# Patient Record
Sex: MALE | Race: WHITE
[De-identification: names, ages, dates, MRNs, and addresses within clinical notes are randomized per-mention and may not be internally consistent; named-entity substitution may affect disease eponyms.]

---

## 2017-07-07 ENCOUNTER — HOSPITAL ENCOUNTER (EMERGENCY)
Dept: HOSPITAL 17 - PHED | Age: 27
Discharge: HOME | End: 2017-07-07
Payer: SELF-PAY

## 2017-07-07 VITALS — BODY MASS INDEX: 30.16 KG/M2 | WEIGHT: 222.67 LBS | HEIGHT: 72 IN

## 2017-07-07 VITALS
DIASTOLIC BLOOD PRESSURE: 84 MMHG | OXYGEN SATURATION: 98 % | TEMPERATURE: 98.2 F | SYSTOLIC BLOOD PRESSURE: 124 MMHG | RESPIRATION RATE: 18 BRPM | HEART RATE: 99 BPM

## 2017-07-07 VITALS
DIASTOLIC BLOOD PRESSURE: 84 MMHG | HEART RATE: 99 BPM | TEMPERATURE: 98.2 F | SYSTOLIC BLOOD PRESSURE: 124 MMHG | OXYGEN SATURATION: 98 % | RESPIRATION RATE: 18 BRPM

## 2017-07-07 DIAGNOSIS — K02.9: ICD-10-CM

## 2017-07-07 DIAGNOSIS — R51: Primary | ICD-10-CM

## 2017-07-07 PROCEDURE — 99283 EMERGENCY DEPT VISIT LOW MDM: CPT

## 2017-07-07 NOTE — PD
HPI


Chief Complaint:  Oral / Dental Pain or Problem


Time Seen by Provider:  02:04


Travel History


International Travel<30 days:  No


Contact w/Intl Traveler<30days:  No


Traveled to known affect area:  No





History of Present Illness


HPI


Patient is a 27-year-old male presents to emergency department for evaluation 

of headache.  Patient states that he's noticed his teeth have been hurting 

primarily in the left lower and right lower side for the past few days and this 

is extended into pain under his tongue now radiating up into the occiput of his 

head.  Denies any fever denies any facial swelling.  States that he has not 

followed up with a dentist in the past, he had some amoxicillin that was left 

over from a prior prescription and he took some of these the other day.  Has 

not tried anything for pain prior to arrival.  Does not follow-up with a 

dentist.  Denies the worst headache of his life, denies thunderclap presentation

, denies any blurred vision focalized weakness numbness tingling in extremities.





PFSH


Past Medical History


Asthma:  No


Diminished Hearing:  No


GERD:  Yes (AS AN INFANT)


Seizures:  Yes (seizures as a small child - last seizure around age 3)





Social History


Alcohol Use:  No


Tobacco Use:  No


Substance Use:  No





Allergies-Medications


(Allergen,Severity, Reaction):  


Coded Allergies:  


     No Known Allergies (Verified , 7/7/17)


Reported Meds & Prescriptions





Reported Meds & Active Scripts


Active


Amoxicillin 500 Mg Tab 500 Mg PO BID 10 Days


Ibuprofen 600 Mg Tab 600 Mg PO Q6H PRN








Review of Systems


Except as stated in HPI:  all other systems reviewed are Neg





Physical Exam


Narrative


GENERAL: Well-nourished, well-developed patient.  In no distress.  Appears 

comfortable and not in any pain.


SKIN: Focused skin assessment warm/dry.


HEAD: Normocephalic.  Atraumatic, bearded but no facial swelling seen.


EYES: No scleral icterus. No injection or drainage. 


ENT: TMs clear bilaterally, oropharynx clear and patent, no sublingual 

tenderness.  There are multiple dental caries seen particularly on the molars 

in bilateral upper and lower sides.  There is some erosion on the primary 

incisor on the maxillary surface.  No obvious dental abscess seen.


NECK: Supple, trachea midline. No JVD or lymphadenopathy.


CARDIOVASCULAR: Regular rate and rhythm without murmurs, gallops, or rubs. 


RESPIRATORY: Breath sounds equal bilaterally. No accessory muscle use.


GASTROINTESTINAL: Abdomen soft, non-tender, nondistended. 


MUSCULOSKELETAL: No cyanosis, or edema. 


NEUROLOGICAL: Cranial nerves II through XII are grossly intact and nonfocal, 5 

out of 5 strength in all 4 extremities.


BACK: Nontender without obvious deformity. No CVA tenderness.





Data


Data


Last Documented VS





Vital Signs








  Date Time  Temp Pulse Resp B/P Pulse Ox O2 Delivery O2 Flow Rate FiO2


 


7/7/17 02:01  99 18     


 


7/7/17 01:59 98.2   124/84 98   








Orders





 Ibuprofen (Motrin) (7/7/17 02:15)


Amoxicillin (Trimox) (7/7/17 02:15)








[facility]


Medical Decision Making


Medical Screen Exam Complete:  Yes


Emergency Medical Condition:  Yes


Differential Diagnosis


Dental pain, headache, jaw pain, subarachnoid hemorrhage seems highly unlikely, 

acute intracranial pathology seems highly unlikely.


Narrative Course


27-year-old male appears well in no obvious distress.  Complains of a headache 

and sublingual pain.  Multiple dental caries seen.  Discussed he needs follow-

up with dentist.  He was given ibuprofen for pain, ambulated from the emergency 

department smiling and in no obvious distress.  He is stable for discharge at 

this time.  He was also given a dental referral packet and a referral to the 

[facility].





Diagnosis





 Primary Impression:  


 Headache


 Additional Impression:  


 Dental caries





***Additional Instructions:


Take your antibiotics until all of them are gone.  Follow-up with a dentist, 

follow-up with your primary care physician or the [facility] for a checkup.  

If he start having high fevers return to the emergency department.


***Med/Other Pt SpecificInfo:  Prescription(s) given


Scripts


Amoxicillin 500 Mg Ndo720 Mg PO BID  10 Days  Ref 0


   Prov:Ermias Grant MD         7/7/17 


Ibuprofen 600 Mg Vll680 Mg PO Q6H PRN (PAIN) #30 TAB  Ref 0


   Prov:Ermias Grant MD         7/7/17


Disposition:  01 DISCHARGE HOME


Condition:  Stable








Ermias Grant MD Jul 7, 2017 02:06

## 2017-08-12 ENCOUNTER — HOSPITAL ENCOUNTER (EMERGENCY)
Dept: HOSPITAL 17 - PHED | Age: 27
LOS: 1 days | Discharge: HOME | End: 2017-08-13
Payer: MEDICAID

## 2017-08-12 VITALS — BODY MASS INDEX: 29.26 KG/M2 | WEIGHT: 216.05 LBS | HEIGHT: 72 IN

## 2017-08-12 VITALS
TEMPERATURE: 98.5 F | OXYGEN SATURATION: 95 % | RESPIRATION RATE: 18 BRPM | HEART RATE: 81 BPM | SYSTOLIC BLOOD PRESSURE: 135 MMHG | DIASTOLIC BLOOD PRESSURE: 95 MMHG

## 2017-08-12 DIAGNOSIS — K04.7: ICD-10-CM

## 2017-08-12 DIAGNOSIS — Z72.0: ICD-10-CM

## 2017-08-12 DIAGNOSIS — K02.9: Primary | ICD-10-CM

## 2017-08-12 PROCEDURE — 93005 ELECTROCARDIOGRAM TRACING: CPT

## 2017-08-12 PROCEDURE — 96365 THER/PROPH/DIAG IV INF INIT: CPT

## 2017-08-12 PROCEDURE — 99284 EMERGENCY DEPT VISIT MOD MDM: CPT

## 2017-08-13 VITALS
HEART RATE: 80 BPM | SYSTOLIC BLOOD PRESSURE: 130 MMHG | DIASTOLIC BLOOD PRESSURE: 88 MMHG | OXYGEN SATURATION: 98 % | RESPIRATION RATE: 18 BRPM

## 2017-08-13 NOTE — PD
HPI


Chief Complaint:  Chest Pain


Time Seen by Provider:  00:39


Travel History


International Travel<30 days:  No


Contact w/Intl Traveler<30days:  No


Traveled to known affect area:  No





History of Present Illness


HPI


The patient is a 27-year-old male that complains of a dental infection around 

teeth #17 and 18.  He does have a dental appointment next month.  He felt 

little bit of chest pain but this apparently has resolved and was related to 

his swelling in his left cheek.  He does notice swelling in the left buccal 

area.  He denies any fever or chills.  He denies any history of heart disease.





[facility]


Past Medical History


Medical History:  Denies Significant Hx


Asthma:  No


Diminished Hearing:  No


GERD:  Yes (AS AN INFANT)


Seizures:  Yes (seizures as a small child - last seizure around age 3)


Tetanus Vaccination:  Unknown


Influenza Vaccination:  No





Past Surgical History


Surgical History:  No Previous Surgery





Social History


Alcohol Use:  Yes


Tobacco Use:  Yes


Substance Use:  No





Allergies-Medications


(Allergen,Severity, Reaction):  


Coded Allergies:  


     No Known Allergies (Verified , 8/12/17)


Reported Meds & Prescriptions





Reported Meds & Active Scripts


Active








Review of Systems


Except as stated in HPI:  all other systems reviewed are Neg





Physical Exam


Narrative


GENERAL: The patient is alert, oriented 3 and moderate apparent distress with 

his dental discomfort.  His vital signs show blood pressure 135/95 but are 

otherwise normal.


SKIN: Focused skin assessment warm/dry.


HEAD: Atraumatic. Normocephalic. 


EYES: Pupils equal and round. No scleral icterus. No injection or drainage. 


ENT: No nasal bleeding or discharge.  Mucous membranes pink and moist.


NECK: Trachea midline. No JVD. 


CARDIOVASCULAR: Regular rate and rhythm.  No murmur appreciated.


RESPIRATORY: No accessory muscle use. Clear to auscultation. Breath sounds 

equal bilaterally. 


GASTROINTESTINAL: Abdomen soft, non-tender, nondistended. Hepatic and splenic 

margins not palpable. 


MUSCULOSKELETAL: No obvious deformities. No clubbing.  No cyanosis.  No edema. 


NEUROLOGICAL: Awake and alert. No obvious cranial nerve deficits.  Motor 

grossly within normal limits. Normal speech.


PSYCHIATRIC: Appropriate mood and affect; insight and judgment normal.


DENTAL: No loose or chipped teeth.  No malocclusion.  There is exquisite 

tenderness over teeth #17 and 18 without any drainable abscess.  There is 

externally visible buccal swelling on the left.





Data


Data


Last Documented VS





Vital Signs








  Date Time  Temp Pulse Resp B/P Pulse Ox O2 Delivery O2 Flow Rate FiO2


 


8/13/17 00:00      Room Air  


 


8/12/17 23:56 98.5 81 18 135/95 95   











MDM


Medical Decision Making


Medical Screen Exam Complete:  Yes


Emergency Medical Condition:  Yes


Medical Record Reviewed:  Yes


Interpretation(s)


The EKG shows sinus rhythm with a rate of 82 and is completely normal.


Differential Diagnosis


Dental infection, Harlan's anginaunlikely, drainable abscess, acute coronary 

syndromehighly unlikely


Narrative Course


The patient has a dental infection.  There are no drainable abscess is present.





Diagnosis





 Primary Impression:  


 Dental caries





***Additional Instructions:


Make sure you keep your appointment with a dentist.  I gave you to refills on 

the antibiotics and it is free at [facility].  The Motrin is 800 mg 3 

times daily.


***Med/Other Pt SpecificInfo:  Prescription(s) given


Scripts


Amoxicillin 500 Mg Loz265 Mg PO TID  14 Days  Ref 0


   Prov:Linden Rosas MD         8/13/17


Disposition:  01 DISCHARGE HOME


Condition:  Stable








Linden Rosas MD Aug 13, 2017 00:46

## 2017-08-14 NOTE — EKG
Date Performed: 08/12/2017       Time Performed: 23:56:12

 

PTAGE:      27 years

 

EKG:      Sinus rhythm 

 

 NORMAL ECG INTERPRETATION BASED ON A DEFAULT AGE OF 41 YEARS 

 

 PREVIOUS TRACING             06/26/2013 19.53.32 Since previous tracing, no significant change noted


 

DOCTOR:   Pallavi Michel  Interpretating Date/Time  08/14/2017 16:47:12

## 2017-08-29 ENCOUNTER — HOSPITAL ENCOUNTER (EMERGENCY)
Dept: HOSPITAL 17 - PHED | Age: 27
Discharge: HOME | End: 2017-08-29
Payer: MEDICAID

## 2017-08-29 VITALS — HEIGHT: 72 IN | WEIGHT: 216.05 LBS | BODY MASS INDEX: 29.26 KG/M2

## 2017-08-29 VITALS
OXYGEN SATURATION: 98 % | SYSTOLIC BLOOD PRESSURE: 131 MMHG | DIASTOLIC BLOOD PRESSURE: 81 MMHG | TEMPERATURE: 97.7 F | HEART RATE: 69 BPM | RESPIRATION RATE: 18 BRPM

## 2017-08-29 DIAGNOSIS — K04.7: Primary | ICD-10-CM

## 2017-08-29 PROCEDURE — 99283 EMERGENCY DEPT VISIT LOW MDM: CPT

## 2017-08-29 NOTE — PD
HPI


Chief Complaint:  Oral / Dental Pain or Problem


Time Seen by Provider:  05:01


Travel History


International Travel<30 days:  No


Contact w/Intl Traveler<30days:  No


Traveled to known affect area:  No





History of Present Illness


HPI


The patient is a 27-year-old male with a history of dental infection who 

complains of numbness along the lower lip.  He is already on amoxicillin and 

the ibuprofen.  He has very little pain and he has a dental appointment on the 

sixth of next month.





[facility]


Past Medical History


Asthma:  No


Diminished Hearing:  No


GERD:  Yes (AS AN INFANT)


Immunizations Current:  Yes


Seizures:  Yes (seizures as a small child - last seizure around age 3)


Tetanus Vaccination:  Unknown


Influenza Vaccination:  No





Past Surgical History


Surgical History:  No Previous Surgery





Social History


Alcohol Use:  Yes


Tobacco Use:  Yes


Substance Use:  No





Allergies-Medications


(Allergen,Severity, Reaction):  


Coded Allergies:  


     No Known Allergies (Verified , 8/29/17)


Reported Meds & Prescriptions





Reported Meds & Active Scripts


Active


Amoxicillin 500 Mg Tab 500 Mg PO TID 14 Days








Review of Systems


Except as stated in HPI:  all other systems reviewed are Neg





Physical Exam


Narrative


GENERAL: Well-nourished, well-developed patient in minimal apparent distress 

with his dental discomfort.  His vital signs are normal.


SKIN: Focused skin assessment warm/dry.


HEAD: Normocephalic.


EYES: No scleral icterus. No injection or drainage. 


NECK: Supple, trachea midline. No JVD or lymphadenopathy.


CARDIOVASCULAR: Regular rate and rhythm without murmurs, gallops, or rubs. 


RESPIRATORY: Breath sounds equal bilaterally. No accessory muscle use.


GASTROINTESTINAL: Abdomen soft, non-tender, nondistended. 


MUSCULOSKELETAL: No cyanosis, or edema. 


BACK: Nontender without obvious deformity. No CVA tenderness. 


DENTAL: No loose or chipped teeth.  No malocclusion.  The patient does have 

some slight tenderness along the area of tooth #17.  No drainable abscesses are 

noted.





Data


Data


Last Documented VS





Vital Signs








  Date Time  Temp Pulse Resp B/P (MAP) Pulse Ox O2 Delivery O2 Flow Rate FiO2


 


8/29/17 04:32 97.7 69 18 131/81 (98) 98   











MDM


Medical Decision Making


Medical Screen Exam Complete:  Yes


Emergency Medical Condition:  Yes


Medical Record Reviewed:  Yes


Differential Diagnosis


Drainable dental abscess, non-drainable dental infection, TMJ pain, ear pain-

unlikely


Narrative Course


The patient has a dental infection which is non-drainable.  He will be 

continuing the amoxicillin and ibuprofen which appear to be working fairly 

well.  He will follow-up as scheduled with his dentist or try to get an earlier 

appointment.





Diagnosis





 Primary Impression:  


 Dental infection


Scripts


Amoxicillin (Amoxicillin) 500 Mg Tab


500 MG PO TID for Infection, #30 TAB 0 Refills


   Prov: Linden Rosas MD         8/29/17 


Ibuprofen (Ibuprofen) 800 Mg Tab


800 MG PO TID, #33 TAB 0 Refills


   Prov: Linden oRsas MD         8/29/17


Disposition:  01 DISCHARGE HOME


Condition:  Stable











Linden Rosas MD Aug 29, 2017 05:08

## 2017-09-19 ENCOUNTER — HOSPITAL ENCOUNTER (EMERGENCY)
Dept: HOSPITAL 17 - NEPE | Age: 27
Discharge: HOME | End: 2017-09-19
Payer: MEDICAID

## 2017-09-19 ENCOUNTER — HOSPITAL ENCOUNTER (EMERGENCY)
Dept: HOSPITAL 17 - PHED | Age: 27
Discharge: HOME | End: 2017-09-19
Payer: MEDICAID

## 2017-09-19 VITALS
OXYGEN SATURATION: 98 % | RESPIRATION RATE: 12 BRPM | DIASTOLIC BLOOD PRESSURE: 93 MMHG | TEMPERATURE: 98.3 F | HEART RATE: 67 BPM | SYSTOLIC BLOOD PRESSURE: 141 MMHG

## 2017-09-19 VITALS — BODY MASS INDEX: 29.26 KG/M2 | HEIGHT: 72 IN | WEIGHT: 216.05 LBS

## 2017-09-19 VITALS
SYSTOLIC BLOOD PRESSURE: 126 MMHG | RESPIRATION RATE: 14 BRPM | HEART RATE: 69 BPM | DIASTOLIC BLOOD PRESSURE: 73 MMHG | OXYGEN SATURATION: 98 %

## 2017-09-19 VITALS
OXYGEN SATURATION: 98 % | SYSTOLIC BLOOD PRESSURE: 146 MMHG | DIASTOLIC BLOOD PRESSURE: 70 MMHG | RESPIRATION RATE: 18 BRPM | HEART RATE: 86 BPM | TEMPERATURE: 98.5 F

## 2017-09-19 DIAGNOSIS — F41.9: Primary | ICD-10-CM

## 2017-09-19 DIAGNOSIS — K02.9: Primary | ICD-10-CM

## 2017-09-19 DIAGNOSIS — R51: ICD-10-CM

## 2017-09-19 DIAGNOSIS — K21.9: ICD-10-CM

## 2017-09-19 DIAGNOSIS — G47.00: ICD-10-CM

## 2017-09-19 DIAGNOSIS — R00.2: ICD-10-CM

## 2017-09-19 DIAGNOSIS — R56.9: ICD-10-CM

## 2017-09-19 LAB
ANION GAP SERPL CALC-SCNC: 3 MEQ/L (ref 5–15)
BASOPHILS # BLD AUTO: 0 TH/MM3 (ref 0–0.2)
BASOPHILS NFR BLD: 0.4 % (ref 0–2)
BUN SERPL-MCNC: 6 MG/DL (ref 7–18)
CHLORIDE SERPL-SCNC: 109 MEQ/L (ref 98–107)
CK MB SERPL-MCNC: 0.6 NG/ML (ref 0.5–3.6)
CK SERPL-CCNC: 249 U/L (ref 39–308)
EOSINOPHIL # BLD: 0.3 TH/MM3 (ref 0–0.4)
EOSINOPHIL NFR BLD: 4.1 % (ref 0–4)
ERYTHROCYTE [DISTWIDTH] IN BLOOD BY AUTOMATED COUNT: 13.6 % (ref 11.6–17.2)
GFR SERPLBLD BASED ON 1.73 SQ M-ARVRAT: 92 ML/MIN (ref 89–?)
HCO3 BLD-SCNC: 26.7 MEQ/L (ref 21–32)
HCT VFR BLD CALC: 42.5 % (ref 39–51)
HEMO FLAGS: (no result)
LYMPHOCYTES # BLD AUTO: 2.3 TH/MM3 (ref 1–4.8)
LYMPHOCYTES NFR BLD AUTO: 28.3 % (ref 9–44)
MCH RBC QN AUTO: 29.5 PG (ref 27–34)
MCHC RBC AUTO-ENTMCNC: 34.9 % (ref 32–36)
MCV RBC AUTO: 84.6 FL (ref 80–100)
MONOCYTES NFR BLD: 7 % (ref 0–8)
NEUTROPHILS # BLD AUTO: 5 TH/MM3 (ref 1.8–7.7)
NEUTROPHILS NFR BLD AUTO: 60.2 % (ref 16–70)
PLATELET # BLD: 238 TH/MM3 (ref 150–450)
POTASSIUM SERPL-SCNC: 4.1 MEQ/L (ref 3.5–5.1)
RBC # BLD AUTO: 5.02 MIL/MM3 (ref 4.5–5.9)
SODIUM SERPL-SCNC: 139 MEQ/L (ref 136–145)
WBC # BLD AUTO: 8.3 TH/MM3 (ref 4–11)

## 2017-09-19 PROCEDURE — 85025 COMPLETE CBC W/AUTO DIFF WBC: CPT

## 2017-09-19 PROCEDURE — 99284 EMERGENCY DEPT VISIT MOD MDM: CPT

## 2017-09-19 PROCEDURE — 80048 BASIC METABOLIC PNL TOTAL CA: CPT

## 2017-09-19 PROCEDURE — 82550 ASSAY OF CK (CPK): CPT

## 2017-09-19 PROCEDURE — 84484 ASSAY OF TROPONIN QUANT: CPT

## 2017-09-19 PROCEDURE — 99283 EMERGENCY DEPT VISIT LOW MDM: CPT

## 2017-09-19 PROCEDURE — 93005 ELECTROCARDIOGRAM TRACING: CPT

## 2017-09-19 PROCEDURE — 82552 ASSAY OF CPK IN BLOOD: CPT

## 2017-09-19 PROCEDURE — 71010: CPT

## 2017-09-19 NOTE — PD
HPI


Chief Complaint:  Chest Pain


Time Seen by Provider:  08:02


Travel History


International Travel<30 days:  No


Contact w/Intl Traveler<30days:  No


Traveled to known affect area:  No





History of Present Illness


HPI


27 year old male complains of dentalgia, cephalgia, palpitations, shooting 

pains in the arms bilaterally, chest pain and insomnia. pt also requests 

undersigned to complete labor release form. dentalgia is chronic in nature and 

patient reports prior tx w amox helpful. he is looking for a dentist. cephalgia 

gradual in onset without fever. no neck stiffness. palpitations come and go. no 

lightheadedness or syncope/loc. pt drinks 1-2 energy drinks daily. shooting 

pains in arms are intermittent. no numbness/weakness. pt denies drug alcohol 

abuse.





PFSH


Past Medical History


Medical History:  Denies Significant Hx


Asthma:  No


Diminished Hearing:  No


GERD:  Yes (AS AN INFANT)


Immunizations Current:  Yes


Seizures:  Yes (seizures as a small child - last seizure around age 3)


Tetanus Vaccination:  Unknown


Pregnant?:  Not Pregnant





Past Surgical History


Surgical History:  No Previous Surgery





Social History


Alcohol Use:  Yes ("every other now and then")


Tobacco Use:  No


Substance Use:  No





Allergies-Medications


(Allergen,Severity, Reaction):  


Coded Allergies:  


     No Known Allergies (Verified , 9/19/17)


Reported Meds & Prescriptions





Reported Meds & Active Scripts


Active


Hydroxyzine HCl 50 Mg Tab 50 Mg PO QID PRN


Penicillin V Potassium 500 Mg Tab 500 Mg PO Q8H 7 Days


Reported


Ibuprofen 200 Mg Cap 400 Mg PO Q4H PRN








Review of Systems


Except as stated in HPI:  all other systems reviewed are Neg


General / Constitutional:  No: Fever





Physical Exam


Narrative


GENERAL: 26 yo male, nad, wnwd, ambulatory


DENTITION: Poor dentition generally; carious dentition L upper and lower sides; 

no ulcerations; no abscess


SKIN: Warm and dry.


HEAD: Atraumatic. Normocephalic. 


EYES: Pupils equal and round. No scleral icterus. No injection or drainage. 


ENT: No nasal bleeding or discharge.  Mucous membranes pink and moist.


NECK: Trachea midline. No JVD. 


CARDIOVASCULAR: Regular rate and rhythm.  


RESPIRATORY: No accessory muscle use. Clear to auscultation. Breath sounds 

equal bilaterally. 


GASTROINTESTINAL: Abdomen soft, non-tender, nondistended. Hepatic and splenic 

margins not palpable. 


MUSCULOSKELETAL: Extremities without clubbing, cyanosis, or edema. No obvious 

deformities. 


NEUROLOGICAL: Awake and alert. No obvious cranial nerve deficits.  Motor 

grossly within normal limits. Five out of 5 muscle strength in the arms and 

legs.  Normal speech.


PSYCHIATRIC: Appropriate mood and affect; insight and judgment normal.





Data


Data


Last Documented VS





Vital Signs








  Date Time  Temp Pulse Resp B/P (MAP) Pulse Ox O2 Delivery O2 Flow Rate FiO2


 


9/19/17 10:15        


 


9/19/17 08:17  69 14  98 Room Air  


 


9/19/17 07:53 98.3       





vs reviewed


Orders





 Orders


Electrocardiogram (9/19/17 )


Complete Blood Count With Diff (9/19/17 08:08)


Basic Metabolic Panel (Bmp) (9/19/17 08:08)


Ckmb (Isoenzyme) Profile (9/19/17 08:08)


Troponin I (9/19/17 08:08)


Chest, Single Ap (9/19/17 08:08)


Iv Access Insert/Monitor (9/19/17 08:08)


Ecg Monitoring (9/19/17 08:08)


Oxygen Administration (9/19/17 08:08)


Oximetry (9/19/17 08:08)


CKMB (9/19/17 08:20)


CKMB% (9/19/17 08:20)





Labs





Laboratory Tests








Test


  9/19/17


08:20


 


White Blood Count 8.3 TH/MM3 


 


Red Blood Count 5.02 MIL/MM3 


 


Hemoglobin 14.8 GM/DL 


 


Hematocrit 42.5 % 


 


Mean Corpuscular Volume 84.6 FL 


 


Mean Corpuscular Hemoglobin 29.5 PG 


 


Mean Corpuscular Hemoglobin


Concent 34.9 % 


 


 


Red Cell Distribution Width 13.6 % 


 


Platelet Count 238 TH/MM3 


 


Mean Platelet Volume 9.3 FL 


 


Neutrophils (%) (Auto) 60.2 % 


 


Lymphocytes (%) (Auto) 28.3 % 


 


Monocytes (%) (Auto) 7.0 % 


 


Eosinophils (%) (Auto) 4.1 % 


 


Basophils (%) (Auto) 0.4 % 


 


Neutrophils # (Auto) 5.0 TH/MM3 


 


Lymphocytes # (Auto) 2.3 TH/MM3 


 


Monocytes # (Auto) 0.6 TH/MM3 


 


Eosinophils # (Auto) 0.3 TH/MM3 


 


Basophils # (Auto) 0.0 TH/MM3 


 


CBC Comment DIFF FINAL 


 


Differential Comment  


 


Blood Urea Nitrogen 6 MG/DL 


 


Creatinine 0.98 MG/DL 


 


Random Glucose 117 MG/DL 


 


Calcium Level 8.8 MG/DL 


 


Sodium Level 139 MEQ/L 


 


Potassium Level 4.1 MEQ/L 


 


Chloride Level 109 MEQ/L 


 


Carbon Dioxide Level 26.7 MEQ/L 


 


Anion Gap 3 MEQ/L 


 


Estimat Glomerular Filtration


Rate 92 ML/MIN 


 


 


Total Creatine Kinase 249 U/L 


 


Creatine Kinase MB 0.6 NG/ML 


 


Troponin I


  LESS THAN 0.02


NG/ML











MDM


Medical Decision Making


Medical Screen Exam Complete:  Yes


Emergency Medical Condition:  Yes


Differential Diagnosis


anxiety, dental carry, abscess, electrolyte abnormality, anemia, acs, arrhythmia

, meningitis, tmj


Narrative Course


CBC & BMP Diagram


9/19/17 08:20








Calcium Level 8.8





Tn < 0.02


EKG: sinus, no ischemic injury pattern, normal axis and intervals





Last Impressions








Chest X-Ray 9/19/17 0808 Signed





Impressions: 





 Service Date/Time:  Tuesday, September 19, 2017 08:44 - CONCLUSION: No acute 





 disease.       Felipe Kulkarni MD 





Pt reassured.


Follow up with dentist.


Dental infection may benefit from pen vk. 


Follow up with pmd for completion of return to work form, including lifting 

abilities and work restrictions.





Diagnosis





 Primary Impression:  


 Dental caries


 Additional Impressions:  


 Headache


 Qualified Codes:  R51 - Headache


 Palpitations


Referrals:  


Dentist


2 days


***Med/Other Pt SpecificInfo:  Prescription(s) given


Scripts


Penicillin V Potassium (Penicillin V Potassium) 500 Mg Tab


500 MG PO Q8H for Infection for 7 Days, #21 TAB 0 Refills


   Prov: Brock Valdes MD         9/19/17


Disposition:  01 DISCHARGE HOME


Condition:  Stable











Brock Valdes MD Sep 19, 2017 10:01

## 2017-09-19 NOTE — PD
HPI


.


Palpitations


Chief Complaint:  Anxiety


Time Seen by Provider:  15:47


Travel History


International Travel<30 days:  No


Contact w/Intl Traveler<30days:  No


Traveled to known affect area:  No





History of Present Illness


HPI


Patient presents with a several day history of intermittent palpitations, 

paresthesias of the hands and feet and blurred vision.  He states that the 

symptoms tend to occur at rest and resolve when he is busy doing something.  He 

states that he works as a  and was down in [place] working.  He 

developed these symptoms and was seen at a hospital in [place] yesterday morning.

  He states that he was given a couple of bags of fluid and some medication.  

He states that he subsequently slept the whole day.  He was then sent home by 

his employer yesterday evening.  He presented to the emergency department at 

[facility] this morning for these symptoms.  He was fully evaluated 

and his workup was negative.  He was discharged home with a prescription for 

penicillin for his dental caries.  He states that his symptoms have persisted 

causing him to present to us this afternoon.  He rates his chest pain as 7/10.





PFSH


Past Medical History


Asthma:  No


Diminished Hearing:  No


GERD:  Yes (AS AN INFANT)


Immunizations Current:  Yes


Seizures:  Yes (seizures as a small child - last seizure around age 3)


Influenza Vaccination:  No


Pregnant?:  Not Pregnant





Past Surgical History


Surgical History:  No Previous Surgery





Social History


Alcohol Use:  Yes ("every other now and then")


Tobacco Use:  No


Substance Use:  No





Allergies-Medications


(Allergen,Severity, Reaction):  


Coded Allergies:  


     No Known Allergies (Verified , 9/19/17)


Reported Meds & Prescriptions





Reported Meds & Active Scripts


Active


Penicillin V Potassium 500 Mg Tab 500 Mg PO Q8H 7 Days


Reported


Ibuprofen 200 Mg Cap 400 Mg PO Q4H PRN








Review of Systems


Except as stated in HPI:  all other systems reviewed are Neg


General / Constitutional:  No: Fever, Chills


Eyes:  Positive: Blurred Vision


HENT:  Positive: Dental Difficulties


Cardiovascular:  Positive: Chest Pain or Discomfort, Palpitations


Respiratory:  Positive: Shortness of Breath


Neurologic:  Positive: Paresthesia





Physical Exam


Narrative


GENERAL: Awake and alert and in no acute distress.


SKIN:  warm/dry.  Sunburn.


HEAD: Normocephalic.  Atraumatic.


EYES: Pupils equal and round. No scleral icterus. No injection or drainage. 


ENT: No nasal bleeding or discharge.  Mucous membranes pink and moist.


NECK: Trachea midline.  Full range of motion without pain.. 


CARDIOVASCULAR: Regular rate and rhythm.  Heart sounds are normal.


RESPIRATORY: No accessory muscle use. Clear to auscultation. Breath sounds 

equal bilaterally. 


GASTROINTESTINAL: Abdomen soft.  Nontender.  Bowel sounds present.  

Nondistended.


MUSCULOSKELETAL: No obvious deformities. 


NEUROLOGICAL: Awake and alert. No obvious cranial nerve deficits.  Motor 

grossly within normal limits. Normal speech.


PSYCHIATRIC: Appropriate mood and affect; insight and judgment normal.





Data


Data


Last Documented VS





Vital Signs








  Date Time  Temp Pulse Resp B/P (MAP) Pulse Ox O2 Delivery O2 Flow Rate FiO2


 


9/19/17 15:34  86      


 


9/19/17 15:31 98.5  18 146/70 (95) 98   











MDM


Medical Decision Making


Medical Screen Exam Complete:  Yes


Emergency Medical Condition:  Yes


Medical Record Reviewed:  Yes (this patient was seen earlier today and had an 

unremarkable CBC, normal electrolytes, normal BUN and creatinine, , 

troponin of less than 0.02 and a normal EKG.)


Differential Diagnosis


Differential diagnosis of palpitations includes but is not limited to anxiety, 

SVT, aVF with RVR, VT, sinus tachycardia, PVCs


Narrative Course


This patient presents with signs and symptoms that are classic for panic 

attacks.  He has already had a normal workup earlier today at the main 

hospital.  He will be discharged home with a prescription for hydroxyzine and 

with instructions to follow up at the [facility].





Diagnosis





 Primary Impression:  


 Anxiety


Patient Instructions:  Anxiety (DC), General Instructions


***Med/Other Pt SpecificInfo:  Prescription(s) given


Scripts


Hydroxyzine HCl (Hydroxyzine HCl) 50 Mg Tab


50 MG PO QID Y for panic attacks, #30 TAB 0 Refills


   Prov: Stephanie Baker MD         9/19/17


Disposition:  01 DISCHARGE HOME


Condition:  Stable











Stephanie Baker MD Sep 19, 2017 15:55

## 2017-09-19 NOTE — RADRPT
EXAM DATE/TIME:  09/19/2017 08:44 

 

[facility] COMPARISON:     

No previous studies available for comparison.

 

                     

INDICATIONS :     

Chest pain. Patient complains of headache, bilateral hand and arm pain., dizziness, and heart racing.


                     

 

MEDICAL HISTORY :     

None.          

 

SURGICAL HISTORY :     

None.   

 

ENCOUNTER:     

Initial                                        

 

ACUITY:     

2 days      

 

PAIN SCORE:     

0/10

 

LOCATION:     

Bilateral chest 

 

FINDINGS:     

A single view of the chest demonstrates the lungs to be symmetrically aerated without evidence of mas
s, infiltrate or effusion.  The cardiomediastinal contours are unremarkable.  Osseous structures are 
intact.

 

CONCLUSION:     No acute disease.  

 

 

 

 Felipe Kulkarni MD on September 19, 2017 at 9:08           

Board Certified Radiologist.

 This report was verified electronically.

## 2017-09-19 NOTE — EKG
Date Performed: 09/19/2017       Time Performed: 08:11:39

 

PTAGE:      27 years

 

EKG:      Sinus rhythm 

 

 NORMAL ECG Compared to prior tracing no significant change 

 

 PREVIOUS TRACING            : 08/12/2017 23.56

 

DOCTOR:   Florentino Patel  Interpretating Date/Time  09/19/2017 14:06:51

## 2017-09-26 ENCOUNTER — HOSPITAL ENCOUNTER (EMERGENCY)
Dept: HOSPITAL 17 - PHED | Age: 27
Discharge: HOME | End: 2017-09-26
Payer: MEDICAID

## 2017-09-26 VITALS
RESPIRATION RATE: 16 BRPM | SYSTOLIC BLOOD PRESSURE: 126 MMHG | DIASTOLIC BLOOD PRESSURE: 76 MMHG | HEART RATE: 69 BPM | OXYGEN SATURATION: 99 % | TEMPERATURE: 97.8 F

## 2017-09-26 VITALS
RESPIRATION RATE: 16 BRPM | OXYGEN SATURATION: 99 % | DIASTOLIC BLOOD PRESSURE: 76 MMHG | HEART RATE: 55 BPM | SYSTOLIC BLOOD PRESSURE: 133 MMHG

## 2017-09-26 VITALS — WEIGHT: 214.29 LBS | BODY MASS INDEX: 29.02 KG/M2 | HEIGHT: 72 IN

## 2017-09-26 VITALS
DIASTOLIC BLOOD PRESSURE: 73 MMHG | OXYGEN SATURATION: 99 % | SYSTOLIC BLOOD PRESSURE: 119 MMHG | RESPIRATION RATE: 16 BRPM | HEART RATE: 66 BPM

## 2017-09-26 DIAGNOSIS — R07.9: ICD-10-CM

## 2017-09-26 DIAGNOSIS — Z86.69: ICD-10-CM

## 2017-09-26 DIAGNOSIS — F41.8: ICD-10-CM

## 2017-09-26 DIAGNOSIS — R51: Primary | ICD-10-CM

## 2017-09-26 LAB
ANION GAP SERPL CALC-SCNC: 7 MEQ/L (ref 5–15)
BASOPHILS # BLD AUTO: 0.1 TH/MM3 (ref 0–0.2)
BASOPHILS NFR BLD: 0.7 % (ref 0–2)
BUN SERPL-MCNC: 11 MG/DL (ref 7–18)
CHLORIDE SERPL-SCNC: 106 MEQ/L (ref 98–107)
EOSINOPHIL # BLD: 0.5 TH/MM3 (ref 0–0.4)
EOSINOPHIL NFR BLD: 6.1 % (ref 0–4)
ERYTHROCYTE [DISTWIDTH] IN BLOOD BY AUTOMATED COUNT: 12.5 % (ref 11.6–17.2)
GFR SERPLBLD BASED ON 1.73 SQ M-ARVRAT: 112 ML/MIN (ref 89–?)
HCO3 BLD-SCNC: 27.4 MEQ/L (ref 21–32)
HCT VFR BLD CALC: 42.8 % (ref 39–51)
HEMO FLAGS: (no result)
LYMPHOCYTES # BLD AUTO: 3.1 TH/MM3 (ref 1–4.8)
LYMPHOCYTES NFR BLD AUTO: 36.4 % (ref 9–44)
MCH RBC QN AUTO: 28.8 PG (ref 27–34)
MCHC RBC AUTO-ENTMCNC: 34.3 % (ref 32–36)
MCV RBC AUTO: 84 FL (ref 80–100)
MONOCYTES NFR BLD: 9.2 % (ref 0–8)
NEUTROPHILS # BLD AUTO: 4.1 TH/MM3 (ref 1.8–7.7)
NEUTROPHILS NFR BLD AUTO: 47.6 % (ref 16–70)
PLATELET # BLD: 252 TH/MM3 (ref 150–450)
POTASSIUM SERPL-SCNC: 3.7 MEQ/L (ref 3.5–5.1)
RBC # BLD AUTO: 5.09 MIL/MM3 (ref 4.5–5.9)
SODIUM SERPL-SCNC: 140 MEQ/L (ref 136–145)
WBC # BLD AUTO: 8.6 TH/MM3 (ref 4–11)

## 2017-09-26 PROCEDURE — 93005 ELECTROCARDIOGRAM TRACING: CPT

## 2017-09-26 PROCEDURE — 96374 THER/PROPH/DIAG INJ IV PUSH: CPT

## 2017-09-26 PROCEDURE — 84484 ASSAY OF TROPONIN QUANT: CPT

## 2017-09-26 PROCEDURE — 85025 COMPLETE CBC W/AUTO DIFF WBC: CPT

## 2017-09-26 PROCEDURE — 80048 BASIC METABOLIC PNL TOTAL CA: CPT

## 2017-09-26 PROCEDURE — 70450 CT HEAD/BRAIN W/O DYE: CPT

## 2017-09-26 PROCEDURE — 99285 EMERGENCY DEPT VISIT HI MDM: CPT

## 2017-09-26 NOTE — RADRPT
EXAM DATE/TIME:  09/26/2017 02:05 

 

[facility] COMPARISON:     

No previous studies available for comparison.

 

 

INDICATIONS :     

Cephalgia.

                      

 

RADIATION DOSE:     

57.86 CTDIvol (mGy) 

 

 

 

MEDICAL HISTORY :     

Seizures.  

 

SURGICAL HISTORY :      

None. 

 

ENCOUNTER:      

Initial

 

ACUITY:      

1 day

 

PAIN SCALE:      

7/10

 

LOCATION:       

Bilateral cranial 

 

TECHNIQUE:     

Multiple contiguous axial images were obtained of the head.  Using automated exposure control and adj
ustment of the mA and/or kV according to patient size, radiation dose was kept as low as reasonably a
chievable to obtain optimal diagnostic quality images.   DICOM format image data is available electro
nically for review and comparison.  

 

FINDINGS:     

 

CEREBRUM:     

The ventricles are normal for age.  No evidence of midline shift, mass lesion, hemorrhage or acute in
farction.  No extra-axial fluid collections are seen.

 

POSTERIOR FOSSA:     

The cerebellum and brainstem are intact.  The 4th ventricle is midline.  The cerebellopontine angle i
s unremarkable.

 

EXTRACRANIAL:     

The visualized portion of the orbits is intact.

 

SKULL:     

The calvaria is intact.  No evidence of skull fracture.

 

CONCLUSION:     

Normal examination.  

 

 

 

 Vitaliy Farrell MD on September 26, 2017 at 2:17           

Board Certified Radiologist.

 This report was verified electronically.

## 2017-09-26 NOTE — PD
HPI


Chief Complaint:  Chest Pain


Time Seen by Provider:  01:48


Travel History


International Travel<30 days:  No


Contact w/Intl Traveler<30days:  No


Traveled to known affect area:  No





History of Present Illness


HPI


The patient is a 27-year-old male that complains of head pain for possibly 1 

hour.  He is a frequent visitor to the emergency department usually for minor 

problems like anxiety and dental infections.  The patient denies any focal 

neurologic change.  He said he had some slight chest pain but this is gone.  He 

does not have a history of heart disease.  He may have a history of alcohol 

abuse.





PFSH


Past Medical History


Asthma:  No


Diminished Hearing:  No


GERD:  Yes (AS AN INFANT)


Immunizations Current:  Yes


Seizures:  Yes (seizures as a small child - last seizure around age 3)


Tetanus Vaccination:  > 5 Years


Influenza Vaccination:  No





Past Surgical History


Surgical History:  No Previous Surgery





Social History


Alcohol Use:  Yes (Occasionally)


Tobacco Use:  No


Substance Use:  No





Allergies-Medications


(Allergen,Severity, Reaction):  


Coded Allergies:  


     No Known Allergies (Verified , 9/26/17)


Reported Meds & Prescriptions





Reported Meds & Active Scripts


Active


Hydroxyzine HCl 50 Mg Tab 50 Mg PO QID PRN


Penicillin V Potassium 500 Mg Tab 500 Mg PO Q8H 7 Days


Reported


Ibuprofen 200 Mg Cap 400 Mg PO Q4H PRN








Review of Systems


Except as stated in HPI:  all other systems reviewed are Neg





Physical Exam


Narrative


GENERAL: The patient is alert, oriented 3 in minimal apparent distress with 

his head pain.  His vital signs are normal.


SKIN: Focused skin assessment warm/dry.


HEAD: Atraumatic. Normocephalic. 


EYES: Pupils equal and round. No scleral icterus. No injection or drainage. 


ENT: No nasal bleeding or discharge.  Mucous membranes pink and moist.


NECK: Trachea midline. No JVD.  There is no meningismus present.


CARDIOVASCULAR: Regular rate and rhythm.  No murmur appreciated.


RESPIRATORY: No accessory muscle use. Clear to auscultation. Breath sounds 

equal bilaterally. 


GASTROINTESTINAL: Abdomen soft, non-tender, nondistended. Hepatic and splenic 

margins not palpable.  No guarding or rebound is present.


MUSCULOSKELETAL: No obvious deformities. No clubbing.  No cyanosis.  No edema. 


NEUROLOGICAL: Awake and alert. No obvious cranial nerve deficits.  Motor 

grossly within normal limits. Normal speech.


PSYCHIATRIC: Appropriate mood and affect; insight and judgment normal.





Data


Data


Last Documented VS





Vital Signs








  Date Time  Temp Pulse Resp B/P (MAP) Pulse Ox O2 Delivery O2 Flow Rate FiO2


 


9/26/17 01:06 97.8 69 16 126/76 (93) 99   








Orders





 Orders


Electrocardiogram (9/26/17 01:48)


Complete Blood Count With Diff (9/26/17 01:48)


Basic Metabolic Panel (Bmp) (9/26/17 01:48)


Troponin I (9/26/17 01:48)


Ct Brain W/O Iv Contrast(Rout) (9/26/17 01:48)


Ketorolac Inj (Toradol Inj) (9/26/17 02:45)





Labs





Laboratory Tests








Test


  9/26/17


02:15


 


White Blood Count 8.6 TH/MM3 


 


Red Blood Count 5.09 MIL/MM3 


 


Hemoglobin 14.7 GM/DL 


 


Hematocrit 42.8 % 


 


Mean Corpuscular Volume 84.0 FL 


 


Mean Corpuscular Hemoglobin 28.8 PG 


 


Mean Corpuscular Hemoglobin


Concent 34.3 % 


 


 


Red Cell Distribution Width 12.5 % 


 


Platelet Count 252 TH/MM3 


 


Mean Platelet Volume 9.3 FL 


 


Neutrophils (%) (Auto) 47.6 % 


 


Lymphocytes (%) (Auto) 36.4 % 


 


Monocytes (%) (Auto) 9.2 % 


 


Eosinophils (%) (Auto) 6.1 % 


 


Basophils (%) (Auto) 0.7 % 


 


Neutrophils # (Auto) 4.1 TH/MM3 


 


Lymphocytes # (Auto) 3.1 TH/MM3 


 


Monocytes # (Auto) 0.8 TH/MM3 


 


Eosinophils # (Auto) 0.5 TH/MM3 


 


Basophils # (Auto) 0.1 TH/MM3 


 


CBC Comment DIFF FINAL 


 


Differential Comment  


 


Blood Urea Nitrogen 11 MG/DL 


 


Creatinine 0.83 MG/DL 


 


Random Glucose 99 MG/DL 


 


Calcium Level 8.9 MG/DL 


 


Sodium Level 140 MEQ/L 


 


Potassium Level 3.7 MEQ/L 


 


Chloride Level 106 MEQ/L 


 


Carbon Dioxide Level 27.4 MEQ/L 


 


Anion Gap 7 MEQ/L 


 


Estimat Glomerular Filtration


Rate 112 ML/MIN 


 


 


Troponin I


  LESS THAN 0.02


NG/ML











MDM


Medical Decision Making


Medical Screen Exam Complete:  Yes


Emergency Medical Condition:  Yes


Medical Record Reviewed:  Yes


Interpretation(s)


The basic metabolic profile is normal and the troponin I is normal.  The CBC is 

normal.  The CT brain is normal.


Differential Diagnosis


Anxiety, tension headache, drug seeking behavior, migraine headache-unlikely,


Narrative Course


The patient has anxiety but also has a headache which may simply be a 

conversion reaction or tension headache.  The patient is given Motrin for this.





Diagnosis





 Primary Impression:  


 Headache


 Additional Impression:  


 Anxiety about health





***Additional Instructions:  


Take the Motrin one tablet every 8 hours as needed for the headache or aches 

and pains that you have.  Follow-up with a primary care physician.


***Med/Other Pt SpecificInfo:  Prescription(s) given


Scripts


Ibuprofen (Ibuprofen) 800 Mg Tab


800 MG PO Q8H Y for headache, #44 TAB 0 Refills


   Prov: Linden Rosas MD         9/26/17


Disposition:  01 DISCHARGE HOME


Condition:  Stable











Linden Rosas MD Sep 26, 2017 01:52

## 2017-09-26 NOTE — EKG
Date Performed: 09/26/2017       Time Performed: 01:12:36

 

PTAGE:      27 years

 

EKG:      Normal Sinus rhythm 

 

 Normal ECG

 

PREVIOUS TRACING       : 09/19/2017 08.11 Unchanged from the prior tracing.

 

DOCTOR:   James Peterson  Interpretating Date/Time  09/26/2017 12:16:21

## 2017-11-09 ENCOUNTER — HOSPITAL ENCOUNTER (EMERGENCY)
Dept: HOSPITAL 17 - PHED | Age: 27
LOS: 1 days | Discharge: HOME | End: 2017-11-10
Payer: MEDICAID

## 2017-11-09 VITALS
SYSTOLIC BLOOD PRESSURE: 135 MMHG | RESPIRATION RATE: 20 BRPM | OXYGEN SATURATION: 98 % | HEART RATE: 57 BPM | DIASTOLIC BLOOD PRESSURE: 71 MMHG

## 2017-11-09 VITALS
SYSTOLIC BLOOD PRESSURE: 127 MMHG | OXYGEN SATURATION: 99 % | DIASTOLIC BLOOD PRESSURE: 69 MMHG | HEART RATE: 56 BPM | RESPIRATION RATE: 16 BRPM | TEMPERATURE: 98.8 F

## 2017-11-09 VITALS
HEART RATE: 80 BPM | SYSTOLIC BLOOD PRESSURE: 142 MMHG | TEMPERATURE: 97.6 F | RESPIRATION RATE: 16 BRPM | DIASTOLIC BLOOD PRESSURE: 77 MMHG | OXYGEN SATURATION: 99 %

## 2017-11-09 VITALS — BODY MASS INDEX: 27.89 KG/M2 | WEIGHT: 205.91 LBS | HEIGHT: 72 IN

## 2017-11-09 DIAGNOSIS — K29.00: Primary | ICD-10-CM

## 2017-11-09 DIAGNOSIS — R94.31: ICD-10-CM

## 2017-11-09 LAB
ALP SERPL-CCNC: 115 U/L (ref 45–117)
ALT SERPL-CCNC: 28 U/L (ref 12–78)
ANION GAP SERPL CALC-SCNC: 7 MEQ/L (ref 5–15)
AST SERPL-CCNC: 11 U/L (ref 15–37)
BASOPHILS # BLD AUTO: 0.1 TH/MM3 (ref 0–0.2)
BASOPHILS NFR BLD: 0.9 % (ref 0–2)
BILIRUB SERPL-MCNC: 0.2 MG/DL (ref 0.2–1)
BUN SERPL-MCNC: 15 MG/DL (ref 7–18)
CHLORIDE SERPL-SCNC: 104 MEQ/L (ref 98–107)
EOSINOPHIL # BLD: 0.6 TH/MM3 (ref 0–0.4)
EOSINOPHIL NFR BLD: 5.9 % (ref 0–4)
ERYTHROCYTE [DISTWIDTH] IN BLOOD BY AUTOMATED COUNT: 12.2 % (ref 11.6–17.2)
GFR SERPLBLD BASED ON 1.73 SQ M-ARVRAT: 94 ML/MIN (ref 89–?)
HCO3 BLD-SCNC: 26.1 MEQ/L (ref 21–32)
HCT VFR BLD CALC: 43.3 % (ref 39–51)
HEMO FLAGS: (no result)
LYMPHOCYTES # BLD AUTO: 3.5 TH/MM3 (ref 1–4.8)
LYMPHOCYTES NFR BLD AUTO: 35.9 % (ref 9–44)
MCH RBC QN AUTO: 28 PG (ref 27–34)
MCHC RBC AUTO-ENTMCNC: 33.9 % (ref 32–36)
MCV RBC AUTO: 82.6 FL (ref 80–100)
MONOCYTES NFR BLD: 6.5 % (ref 0–8)
NEUTROPHILS # BLD AUTO: 5 TH/MM3 (ref 1.8–7.7)
NEUTROPHILS NFR BLD AUTO: 50.8 % (ref 16–70)
PLATELET # BLD: 266 TH/MM3 (ref 150–450)
POTASSIUM SERPL-SCNC: 3.6 MEQ/L (ref 3.5–5.1)
RBC # BLD AUTO: 5.24 MIL/MM3 (ref 4.5–5.9)
SODIUM SERPL-SCNC: 137 MEQ/L (ref 136–145)
WBC # BLD AUTO: 9.8 TH/MM3 (ref 4–11)

## 2017-11-09 PROCEDURE — 83690 ASSAY OF LIPASE: CPT

## 2017-11-09 PROCEDURE — 80053 COMPREHEN METABOLIC PANEL: CPT

## 2017-11-09 PROCEDURE — 99284 EMERGENCY DEPT VISIT MOD MDM: CPT

## 2017-11-09 PROCEDURE — C9113 INJ PANTOPRAZOLE SODIUM, VIA: HCPCS

## 2017-11-09 PROCEDURE — 93005 ELECTROCARDIOGRAM TRACING: CPT

## 2017-11-09 PROCEDURE — 85025 COMPLETE CBC W/AUTO DIFF WBC: CPT

## 2017-11-09 PROCEDURE — 96374 THER/PROPH/DIAG INJ IV PUSH: CPT

## 2017-11-09 PROCEDURE — 84484 ASSAY OF TROPONIN QUANT: CPT

## 2017-11-09 PROCEDURE — 96375 TX/PRO/DX INJ NEW DRUG ADDON: CPT

## 2017-11-09 NOTE — PD
HPI


Chief Complaint:  Chest Pain


Time Seen by Provider:  22:01


Travel History


International Travel<30 days:  No


Contact w/Intl Traveler<30days:  No


Traveled to known affect area:  No





History of Present Illness


HPI


27-year-old male came to the emergency room with history of vomiting, diarrhea 

and dizziness for past 3 days.  His significant other is giving most of the 

history.  She is quite vocal about his chest pain that has been going on for 

over a year now.  As per her patient has been evaluated multiple times in 

[place] and [place] for the chest pain.  However the new symptoms now are the 

vomiting, diarrhea and dizziness.  They're trying to find a primary care for 

him in the meanwhile.  His last vomit was after he had his dinner when he 

gagged and vomited.  No known sick contacts.  Vital signs are stable.  Patient 

says that he has burning in his epigastric area.  He does not appear to be in 

any discomfort.  The burning sensation does not radiate anywhere else.  No 

syncopal episodes.





[facility]


Past Medical History


*** Narrative Medical


List of his past medical, surgical, social and family history is reviewed from 

the nursing note.


Asthma:  No


Diminished Hearing:  No


GERD:  Yes (AS AN INFANT)


Immunizations Current:  Yes


Seizures:  Yes (seizures as a small child - last seizure around age 3)


Pregnant?:  Not Pregnant





Social History


Alcohol Use:  Yes (Occasionally)


Tobacco Use:  No


Substance Use:  No





Allergies-Medications


(Allergen,Severity, Reaction):  


Coded Allergies:  


     No Known Allergies (Verified  Adverse Reaction, Unknown, 11/9/17)


Comments


No known drug allergies.


Reported Meds & Prescriptions





Reported Meds & Active Scripts


Active


Naprosyn (Naproxen) 500 Mg Tab 500 Mg PO Q12HR PRN





Narrative Medication


List of his home medications reviewed from the nursing note.





Review of Systems


Except as stated in HPI:  all other systems reviewed are Neg


Gastrointestinal:  Positive: Nausea, Vomiting, Diarrhea





Physical Exam


Narrative


GENERAL: Awake, alert, no obvious distress


SKIN: Focused skin assessment warm/dry.


HEAD: Atraumatic. Normocephalic. 


EYES: Pupils equal and round. No scleral icterus. No injection or drainage. 


ENT: No nasal bleeding or discharge.  Mucous membranes pink and moist.


NECK: Trachea midline. No JVD. 


CARDIOVASCULAR: Regular rate and rhythm.  No murmur appreciated.


RESPIRATORY: No accessory muscle use. Clear to auscultation. Breath sounds 

equal bilaterally. 


GASTROINTESTINAL: Abdomen soft, non-tender, nondistended. Hepatic and splenic 

margins not palpable. 


MUSCULOSKELETAL: No obvious deformities. No clubbing.  No cyanosis.  No edema. 


NEUROLOGICAL: Awake and alert. No obvious cranial nerve deficits.  Motor 

grossly within normal limits. Normal speech.


PSYCHIATRIC: Appropriate mood and affect; insight and judgment normal.





Data


Data


Last Documented VS





Vital Signs








  Date Time  Temp Pulse Resp B/P (MAP) Pulse Ox O2 Delivery O2 Flow Rate FiO2


 


11/10/17 00:04  51 20 127/69 (88) 98   


 


11/9/17 23:51 98.8       








Orders





 Orders


Electrocardiogram (11/9/17 20:20)


Complete Blood Count With Diff (11/9/17 22:11)


Comprehensive Metabolic Panel (11/9/17 22:11)


Lipase (11/9/17 22:11)


Iv Access Insert/Monitor (11/9/17 22:11)


Ecg Monitoring (11/9/17 22:11)


Oximetry (11/9/17 22:11)


Ondansetron Inj (Zofran Inj) (11/9/17 22:15)


Pantoprazole Inj (Protonix Inj) (11/9/17 22:15)


Sodium Chlor 0.9% 1000 Ml Inj (Ns 1000 M (11/9/17 22:11)


Sodium Chloride 0.9% Flush (Ns Flush) (11/9/17 22:15)


Troponin I (11/9/17 22:11)


Electrocardiogram (11/9/17 )


Ed Discharge Order (11/9/17 23:08)





Labs





Laboratory Tests








Test


  11/9/17


22:25


 


White Blood Count 9.8 TH/MM3 


 


Red Blood Count 5.24 MIL/MM3 


 


Hemoglobin 14.7 GM/DL 


 


Hematocrit 43.3 % 


 


Mean Corpuscular Volume 82.6 FL 


 


Mean Corpuscular Hemoglobin 28.0 PG 


 


Mean Corpuscular Hemoglobin


Concent 33.9 % 


 


 


Red Cell Distribution Width 12.2 % 


 


Platelet Count 266 TH/MM3 


 


Mean Platelet Volume 9.1 FL 


 


Neutrophils (%) (Auto) 50.8 % 


 


Lymphocytes (%) (Auto) 35.9 % 


 


Monocytes (%) (Auto) 6.5 % 


 


Eosinophils (%) (Auto) 5.9 % 


 


Basophils (%) (Auto) 0.9 % 


 


Neutrophils # (Auto) 5.0 TH/MM3 


 


Lymphocytes # (Auto) 3.5 TH/MM3 


 


Monocytes # (Auto) 0.6 TH/MM3 


 


Eosinophils # (Auto) 0.6 TH/MM3 


 


Basophils # (Auto) 0.1 TH/MM3 


 


CBC Comment DIFF FINAL 


 


Differential Comment  


 


Blood Urea Nitrogen 15 MG/DL 


 


Creatinine 0.97 MG/DL 


 


Random Glucose 108 MG/DL 


 


Total Protein 7.3 GM/DL 


 


Albumin 3.8 GM/DL 


 


Calcium Level 8.6 MG/DL 


 


Alkaline Phosphatase 115 U/L 


 


Aspartate Amino Transf


(AST/SGOT) 11 U/L 


 


 


Alanine Aminotransferase


(ALT/SGPT) 28 U/L 


 


 


Total Bilirubin 0.2 MG/DL 


 


Sodium Level 137 MEQ/L 


 


Potassium Level 3.6 MEQ/L 


 


Chloride Level 104 MEQ/L 


 


Carbon Dioxide Level 26.1 MEQ/L 


 


Anion Gap 7 MEQ/L 


 


Estimat Glomerular Filtration


Rate 94 ML/MIN 


 


 


Troponin I


  LESS THAN 0.02


NG/ML


 


Lipase 185 U/L 











MDM


Medical Decision Making


Medical Screen Exam Complete:  Yes


Emergency Medical Condition:  Yes


Medical Record Reviewed:  Yes


Interpretation(s)


Twelve-lead EKG was reviewed by me.  Normal sinus rhythm, normal axis, 

nonspecific ST-T wave changes.  Heart rate of 60 bpm.


Differential Diagnosis


GERD, gastroenteritis, acute gastritis, peptic ulcer disease


Narrative Course


11:04 PM blood test results of back and within acceptable limits.  Patient was 

given IV Zofran and IV Protonix.  I will discharge him home on prescription for 

omeprazole and Zofran.





Procedures


EKG Prior to Arrival:  No





Diagnosis





 Primary Impression:  


 Acute gastritis


 Qualified Codes:  K29.00 - Acute gastritis without bleeding


Referrals:  


Primary Care Physician





***Additional Instructions:  


Please try to find a primary care for yourselves and have them refer you to a 

GI specialist.  In the meanwhile take the medication as per the prescription 

direction that's been given to you from the emergency room.  Avoid certain diet 

like those that have high acid content like lying, lemon, tomatoes, strawberries

, vinegar etc.  Do not drink alcohol till GI has seen you and given further 

recommendations.  Return to the ER if the condition worsens or any other new 

concerns.  Avoid coffee and medications like Motrin/ibuprofen/Advil once again 

until seen by GI and further recommendations are given.


***Med/Other Pt SpecificInfo:  Prescription(s) given


Disposition:  01 DISCHARGE HOME


Condition:  Stable











Mercedes Denton MD Nov 9, 2017 22:01

## 2017-11-10 VITALS — DIASTOLIC BLOOD PRESSURE: 69 MMHG | SYSTOLIC BLOOD PRESSURE: 127 MMHG

## 2017-11-10 NOTE — EKG
Date Performed: 11/09/2017       Time Performed: 22:19:05

 

PTAGE:      27 years

 

EKG:      Sinus rhythm 

 

 NORMAL ECG

 

PREVIOUS TRACING       : 11/09/2017 20.20

 

DOCTOR:   Jun Mccollum  Interpretating Date/Time  11/10/2017 18:07:24

## 2017-11-10 NOTE — EKG
Date Performed: 11/09/2017       Time Performed: 20:20:40

 

PTAGE:      27 years

 

EKG:      SINUS BRADYCARDIA WITH SINUS ARRHYTHMIA LEFT POSTERIOR FASCICULAR BLOCK ABNORMAL QRS-T ANGL
E ABNORMAL ECG 

 

NO PREVIOUS TRACING            

 

DOCTOR:   Jun Mccollum  Interpretating Date/Time  11/10/2017 18:10:19

## 2017-11-12 ENCOUNTER — HOSPITAL ENCOUNTER (EMERGENCY)
Dept: HOSPITAL 17 - NEPD | Age: 27
LOS: 1 days | Discharge: HOME | End: 2017-11-13
Payer: MEDICAID

## 2017-11-12 VITALS — HEIGHT: 72 IN | BODY MASS INDEX: 28.37 KG/M2 | WEIGHT: 209.44 LBS

## 2017-11-12 VITALS
OXYGEN SATURATION: 99 % | DIASTOLIC BLOOD PRESSURE: 78 MMHG | HEART RATE: 73 BPM | TEMPERATURE: 98.1 F | RESPIRATION RATE: 16 BRPM | SYSTOLIC BLOOD PRESSURE: 139 MMHG

## 2017-11-12 DIAGNOSIS — Z86.69: ICD-10-CM

## 2017-11-12 DIAGNOSIS — Z87.19: ICD-10-CM

## 2017-11-12 DIAGNOSIS — R51: Primary | ICD-10-CM

## 2017-11-12 PROCEDURE — 99284 EMERGENCY DEPT VISIT MOD MDM: CPT

## 2017-11-12 PROCEDURE — 96375 TX/PRO/DX INJ NEW DRUG ADDON: CPT

## 2017-11-12 PROCEDURE — 96374 THER/PROPH/DIAG INJ IV PUSH: CPT

## 2017-11-12 NOTE — PD
HPI


Chief Complaint:  Headache


Time Seen by Provider:  22:40


Travel History


International Travel<30 days:  No


Contact w/Intl Traveler<30days:  No


Traveled to known affect area:  No





History of Present Illness


HPI


Patient comes emergency Department complaining of intermittent headaches over 

the past several days.  Patient states they come and go feeling stabbing 

sensation throughout his head.  Patient denies anything making this better or 

worse.  Patient denies doing anything for this.  Denies any chest pain, 

shortness of breath, change in vision, loss change in bowel or bladder, fevers, 

or IV drug use.  Patient has been seen in the ER for similar in the past.





PFSH


Past Medical History


Asthma:  No


Chest Pain:  Yes


Diminished Hearing:  No


GERD:  Yes


Immunizations Current:  Yes


Seizures:  Yes (seizures as a small child - last seizure around age 3)


Tetanus Vaccination:  < 5 Years





Past Surgical History


Surgical History:  No Previous Surgery





Social History


Alcohol Use:  Yes (Occasionally)


Tobacco Use:  No


Substance Use:  No





Allergies-Medications


(Allergen,Severity, Reaction):  


Coded Allergies:  


     No Known Allergies (Verified  Adverse Reaction, Unknown, 11/9/17)


Reported Meds & Prescriptions





Reported Meds & Active Scripts


Active


Naprosyn (Naproxen) 500 Mg Tab 500 Mg PO Q12HR PRN








Review of Systems


Except as stated in HPI:  all other systems reviewed are Neg





Physical Exam


Narrative


GENERAL: Well-developed, overly nourished, in no acute distress, and non-ill 

appearing.


SKIN: Focused skin assessment warm and dry.


HEAD: Atraumatic. Normocephalic. 


EYES: Pupils equal and round. EOMI. No scleral icterus. No injection or 

drainage. 


ENT: No nasal bleeding or discharge.  Mucous membranes pink and moist.


NECK: Trachea midline. Supple.  No nuclear rigidity.


CARDIOVASCULAR: Regular rate and rhythm.  No murmur appreciated.


RESPIRATORY: No accessory muscle use.  No respiratory distress. Clear to 

auscultation. Breath sounds equal bilaterally. 


MUSCULOSKELETAL: No obvious deformities. No clubbing.  No cyanosis.  No edema.  

Full range of motion.


NEUROLOGICAL: Awake and alert. No obvious cranial nerve deficits.  Motor 

grossly within normal limits. Normal speech.


PSYCHIATRIC: Appropriate mood and affect; insight and judgment normal.





Data


Data


Last Documented VS





Vital Signs








  Date Time  Temp Pulse Resp B/P (MAP) Pulse Ox O2 Delivery O2 Flow Rate FiO2


 


11/13/17 00:47        


 


11/12/17 22:42      Room Air  


 


11/12/17 19:49 98.1 73 16  99   








Orders





 Orders


Ecg Monitoring (11/12/17 22:55)


Iv Access Insert/Monitor (11/12/17 22:55)


Oximetry (11/12/17 22:55)


Sodium Chloride 0.9% Flush (Ns Flush) (11/12/17 23:00)


Diphenhydramine Inj (Benadryl Inj) (11/12/17 23:00)


Metoclopramide Inj (Reglan Inj) (11/12/17 23:00)


Sodium Chlor 0.9% 1000 Ml Inj (Ns 1000 M (11/12/17 22:55)


Ed Discharge Order (11/12/17 23:56)








MDM


Medical Decision Making


Medical Screen Exam Complete:  Yes


Emergency Medical Condition:  Yes


Differential Diagnosis


Migraine, recurrent headache,


Narrative Course


The patient looks great and is in no significant objective discomfort 

currently. Headache is nonspecific. Exam is unremarkable. The patient is in no 

distress and the patients neurological exam is normal, neck is supple and 

without meningismus. The headache is not consistent with meningitis or infection

, nor is it consistent with intracranial bleed (SAH etc.), carotid dissection, 

nor mass by history and examination.  Patient reports improvement of symptoms 

after being medicated emergency department.  Medication and outpatient follow 

up was instructed. The patient was instructed to return as needed or if 

symptoms changed or worsened, fever developed or inability to tolerate fluids. 

The patient agreed with plan.





Patient in no obvious distress upon re-evaluation. Patient was asked if they 

wanted to speak to my attending, which the patient did not wish to do at this 

time.  Any questions/concerns in reference to patient diagnosis/condition 

discussed and clarified prior to patient's discharge. Reinforced sheer 

importance of close follow up with patient's primary physician or primary care 

clinic. Instructed patient to return to ED immediately, if symptoms return/

worsen. Patient showed understanding of above instructions.  Further 

instructions and recommendations were detailed in discharge paperwork.  Patient 

ambulated without difficulty out of ED at discharge.





Diagnosis





 Primary Impression:  


 Headache


 Qualified Codes:  R51 - Headache


Patient Instructions:  Acute Headache (ED), General Instructions





***Additional Instructions:  


Follow-up with your primary care physician and/or neurologist this week for 

reevaluation.  Take all medication as prescribed.  Drink plenty of non-

caffeinated and nonalcoholic fluids.  Return to the emergency department if 

symptoms get worse.


***Med/Other Pt SpecificInfo:  Prescription(s) given


Scripts


Naproxen (Naprosyn) 500 Mg Tab


500 MG PO Q12HR Y for HEADACHE, #14 TAB 0 Refills


   Prov: Troy Yoo MD         11/12/17


Disposition:  01 DISCHARGE HOME


Condition:  Stable











Cuco Beatty Nov 12, 2017 22:58

## 2018-01-13 ENCOUNTER — HOSPITAL ENCOUNTER (EMERGENCY)
Dept: HOSPITAL 17 - PHEFT | Age: 28
Discharge: HOME | End: 2018-01-13
Payer: MEDICAID

## 2018-01-13 VITALS
TEMPERATURE: 99.7 F | OXYGEN SATURATION: 97 % | SYSTOLIC BLOOD PRESSURE: 138 MMHG | DIASTOLIC BLOOD PRESSURE: 75 MMHG | RESPIRATION RATE: 18 BRPM | HEART RATE: 96 BPM

## 2018-01-13 VITALS — WEIGHT: 204.37 LBS | HEIGHT: 72 IN | BODY MASS INDEX: 27.68 KG/M2

## 2018-01-13 DIAGNOSIS — J02.9: ICD-10-CM

## 2018-01-13 DIAGNOSIS — K21.9: ICD-10-CM

## 2018-01-13 DIAGNOSIS — B34.9: Primary | ICD-10-CM

## 2018-01-13 DIAGNOSIS — H92.03: ICD-10-CM

## 2018-01-13 PROCEDURE — 99283 EMERGENCY DEPT VISIT LOW MDM: CPT

## 2018-01-13 PROCEDURE — 87804 INFLUENZA ASSAY W/OPTIC: CPT

## 2018-01-13 PROCEDURE — 87880 STREP A ASSAY W/OPTIC: CPT

## 2018-01-13 PROCEDURE — 87081 CULTURE SCREEN ONLY: CPT

## 2018-01-13 NOTE — PD
HPI


Chief Complaint:  Cold / Flu Symptoms


Time Seen by Provider:  10:27


Travel History


International Travel<30 days:  No


Contact w/Intl Traveler<30days:  No


Traveled to known affect area:  No





History of Present Illness


HPI


27-year-old male presents to the ED for evaluation of 4 day history of sore 

throat, nonproductive cough, subjective fevers and body aches.  Endorses 

bilateral ear pain and dulled sense of hearing.  Throat pain rated 7/10, 

worsened by coughing and swallowing.  No alleviating factors reported.  Denies 

headache, sinus congestion, rhinorrhea, abdominal pain, nausea, vomiting, sick 

contacts.  He did not receive this years flu shot.  He is a nonsmoker.  Treated 

with some OTC medications with no improvement of symptoms.





PFSH


Past Medical History


Asthma:  No


Chest Pain:  Yes


Diminished Hearing:  No


GERD:  Yes


Immunizations Current:  Yes


Seizures:  Yes (seizures as a small child - last seizure around age 3)





Social History


Alcohol Use:  Yes (Occasionally)


Tobacco Use:  No


Substance Use:  No





Allergies-Medications


(Allergen,Severity, Reaction):  


Coded Allergies:  


     No Known Allergies (Verified  Adverse Reaction, Unknown, 1/13/18)


Reported Meds & Prescriptions





Reported Meds & Active Scripts


Active


Tessalon Perles (Benzonatate) 100 Mg Cap 200 Mg PO TID PRN








Review of Systems


Except as stated in HPI:  all other systems reviewed are Neg





Physical Exam


Narrative


GENERAL: Well-nourished, well-developed nontoxic appearing white male in no 

acute distress.


SKIN: Warm and dry.


HEAD: Normocephalic.  Atraumatic.


EYES: No scleral icterus. No injection or drainage.  PERRLA.  EOMI.


ENT: Pearly gray tympanic membranes bilaterally.  Bilateral serous effusion.  

Nasal mucosa is moist.  Oropharynx with mild posterior erythema.  Scattered 

petechiae.  No edema or exudate.  Uvula midline.  Airway patent.


NECK: Supple, trachea midline. No JVD or lymphadenopathy.


CARDIOVASCULAR: Regular rate and rhythm without murmurs, gallops, or rubs. 


RESPIRATORY: Breath sounds clear and equal bilaterally. No accessory muscle use.


GASTROINTESTINAL: Abdomen soft, non-tender, nondistended. + Bowel sounds


MUSCULOSKELETAL: No cyanosis, or edema. 


BACK: Nontender without obvious deformity. No CVA tenderness.





Data


Data


Last Documented VS





Vital Signs








  Date Time  Temp Pulse Resp B/P (MAP) Pulse Ox O2 Delivery O2 Flow Rate FiO2


 


1/13/18 10:20 99.7 96 18 138/75 (96) 97   








Orders





 Orders


Influenzae A/B Antigen (1/13/18 10:31)


Group A Rapid Strep Screen (1/13/18 10:31)


Acetaminophen (Tylenol) (1/13/18 10:45)


Strep Culture (Group A) (1/13/18 10:35)


Ed Discharge Order (1/13/18 11:12)








MDM


Medical Decision Making


Medical Screen Exam Complete:  Yes


Emergency Medical Condition:  Yes


Differential Diagnosis


Final syndrome versus pharyngitis versus influenza versus strep pharyngitis 

versus other


Narrative Course


27-year-old male presents to the ED for evaluation of 4 day history of sore 

throat, nonproductive cough, subjective fevers and body aches.  Endorses 

bilateral ear pain and dulled sense of hearing. He did not receive this years 

flu shot.  He is a nonsmoker.  Temp 99.7 on presentation.  Physical exam 

reveals a nontoxic-appearing white male in no acute distress.  Bilateral serous 

effusions of the ears and mild posterior oropharyngeal erythema with scattered 

petechiae.  Chest CTAB.  Exam otherwise unremarkable.  Patient was monitored 

500 mg Tylenol by mouth.  Rapid strep swab and influenza swabs are negative.  

This is viral syndrome.  Patient's provided a short course of Tessalon Perles, 

instructed to treat with OTC medications including ibuprofen or Tylenol as 

needed for fever and body aches.  He is instructed return for worsening 

symptoms.  He is stable and discharged home.





Diagnosis





 Primary Impression:  


 Viral syndrome


Referrals:  


Primary Care Physician


Patient Instructions:  General Instructions, Viral Syndrome (ED)





***Additional Instructions:  


Rest, hydrate.


Push fluids such as sports drinks, Pedialyte, popsicles, clear broth.


Continue with symptomatic treatment with OTC medications.  I recommend a 

multisymptom medication containing decongestant such as Mucinex D.


Alternating ibuprofen and Tylenol every 6-8 as needed for continued fever.


Increase handwashing frequently to avoid the spread of the virus to other 

family members and the community.


Disinfect commonly touched surfaces such as light switches, microwaves, remote 

controls.


Replace toothbrush at the end of this illness.


Follow-up with the primary care provider.


Return to the ED for worsening symptoms or any urgent or emergent medical 

condition.


***Med/Other Pt SpecificInfo:  Prescription(s) given


Scripts


Benzonatate (Tessalon Perles) 100 Mg Cap


200 MG PO TID Y for COUGH, #15 CAP 0 Refills


   Prov: Ermias Grant MD         1/13/18


Disposition:  01 DISCHARGE HOME


Condition:  Stable











Cierra Amin Jan 13, 2018 10:37

## 2018-05-06 ENCOUNTER — HOSPITAL ENCOUNTER (EMERGENCY)
Dept: HOSPITAL 17 - PHED | Age: 28
Discharge: HOME | End: 2018-05-06
Payer: MEDICAID

## 2018-05-06 DIAGNOSIS — R51: Primary | ICD-10-CM

## 2018-05-06 DIAGNOSIS — K21.9: ICD-10-CM

## 2018-05-06 DIAGNOSIS — Z86.69: ICD-10-CM

## 2018-05-06 PROCEDURE — 99283 EMERGENCY DEPT VISIT LOW MDM: CPT

## 2018-05-06 PROCEDURE — 70450 CT HEAD/BRAIN W/O DYE: CPT
